# Patient Record
Sex: FEMALE | Race: WHITE | NOT HISPANIC OR LATINO | URBAN - METROPOLITAN AREA
[De-identification: names, ages, dates, MRNs, and addresses within clinical notes are randomized per-mention and may not be internally consistent; named-entity substitution may affect disease eponyms.]

---

## 2017-09-13 ENCOUNTER — APPOINTMENT (OUTPATIENT)
Age: 62
Setting detail: DERMATOLOGY
End: 2017-09-13

## 2017-09-13 DIAGNOSIS — L82.1 OTHER SEBORRHEIC KERATOSIS: ICD-10-CM

## 2017-09-13 DIAGNOSIS — L81.4 OTHER MELANIN HYPERPIGMENTATION: ICD-10-CM

## 2017-09-13 PROCEDURE — OTHER ADDITIONAL NOTES: OTHER

## 2017-09-13 PROCEDURE — 99202 OFFICE O/P NEW SF 15 MIN: CPT

## 2017-09-13 PROCEDURE — OTHER COUNSELING: OTHER

## 2017-09-13 ASSESSMENT — LOCATION ZONE DERM: LOCATION ZONE: FACE

## 2017-09-13 ASSESSMENT — LOCATION DETAILED DESCRIPTION DERM: LOCATION DETAILED: LEFT CENTRAL MALAR CHEEK

## 2017-09-13 ASSESSMENT — LOCATION SIMPLE DESCRIPTION DERM: LOCATION SIMPLE: LEFT CHEEK

## 2017-09-13 NOTE — HPI: DISCOLORATION
How Severe Is Your Skin Discoloration?: mild
Additional History: The brown spots are asymptomatic.  She is looking for someone willing to rx a 30% alpha hydroxy acid for her to use at home.  She has seen several providers and can't find anyone to give her what she wants.  \\n\\nPatient states she use to get filler injected into her cheeks at a facility in [place], [place]. She states the last time she had filler, she was displeased with her results and thought they injected too much, and she didn't recognize herself anymore. She states she then discontinued all treatment, and shortly thereafter she began to notice brown discoloration spots on her cheek, as well as an indentation in her right cheek. Patient states she is here to discuss her options to alleviate the dark spots. Patient is new to the clinic today.

## 2017-09-13 NOTE — PROCEDURE: ADDITIONAL NOTES
Additional Notes: Discussed cosmetic removal with ln2 if interested.  \\n\\nAlso, discussed with pt that I am not comfortable rx'ing the 30% alpha hydroxy acid that she is looking for as that is not something I am familiar with.  She said she has seen several providers in the area and they have all said the same thing.  She also hasn't been able to find people willing to inject the filler she has into her face either.  Recommended pt schedule a cosmetic consult to discuss her cosmetic concerns further and see if we were comfortable taking over her cosmetic care.  Discussed we use our own products however and would discuss further at consultation.  Pt verbalized understanding.

## 2017-09-18 ENCOUNTER — APPOINTMENT (OUTPATIENT)
Age: 62
Setting detail: DERMATOLOGY
End: 2017-09-18

## 2017-09-18 DIAGNOSIS — Z41.9 ENCOUNTER FOR PROCEDURE FOR PURPOSES OTHER THAN REMEDYING HEALTH STATE, UNSPECIFIED: ICD-10-CM

## 2017-09-18 PROCEDURE — OTHER COSMETIC QUOTE: OTHER

## 2017-09-18 NOTE — PROCEDURE: COSMETIC QUOTE
Injectable  16 Units: 0
Apply Anesthesia Fee: No
Injectable  15 Price/Unit (In Dollars- Use Only Numbers And Decimals): 0.00
Send Charges To Patient Encounter: Yes
Number Of Months: 1
Detail Level: Zone

## 2018-01-16 ENCOUNTER — APPOINTMENT (OUTPATIENT)
Age: 63
Setting detail: DERMATOLOGY
End: 2018-01-16

## 2018-01-16 DIAGNOSIS — Z41.9 ENCOUNTER FOR PROCEDURE FOR PURPOSES OTHER THAN REMEDYING HEALTH STATE, UNSPECIFIED: ICD-10-CM

## 2018-01-16 PROCEDURE — OTHER COSMETIC FOLLOW-UP: OTHER

## 2018-01-16 NOTE — PROCEDURE: COSMETIC FOLLOW-UP
Detail Level: Zone
Comments (Free Text): Client came in with concern about possible scar on upper lip and chin from a fall around Thanksgiving.  Her upper lip, she states she is using Vit E twice a day.  I advised that I am not medical and suggested she see a Dr. Client declined stating her family Dr is treating her. We parted agreeing to follow up in 2 weeks for photo and further discussion.

## 2018-01-30 ENCOUNTER — APPOINTMENT (OUTPATIENT)
Age: 63
Setting detail: DERMATOLOGY
End: 2018-01-30

## 2018-01-30 DIAGNOSIS — Z41.9 ENCOUNTER FOR PROCEDURE FOR PURPOSES OTHER THAN REMEDYING HEALTH STATE, UNSPECIFIED: ICD-10-CM

## 2018-01-30 PROCEDURE — OTHER COSMETIC FOLLOW-UP: OTHER

## 2018-01-30 NOTE — PROCEDURE: COSMETIC FOLLOW-UP
Comments (Free Text): Client came in 2 week post fall, concerned about scar.  She fell again last night at the [facility] [place], she states she reported it. I declined giving her any advice due to she has not seen her PCP.  Client was advised to go to her doctors immediately after leaving here, client demonstrated understanding. Comments (Free Text): Client came in 2 week post fall, concerned about scar.  She fell again last night at the [facility] [place], she states she reported it. I declined giving her any advice due to she has not seen her PCP.  Client was advised to go to her doctors immediately after leaving here, client demonstrated understanding.